# Patient Record
Sex: MALE | Race: WHITE | NOT HISPANIC OR LATINO | Employment: OTHER | URBAN - METROPOLITAN AREA
[De-identification: names, ages, dates, MRNs, and addresses within clinical notes are randomized per-mention and may not be internally consistent; named-entity substitution may affect disease eponyms.]

---

## 2017-02-07 ENCOUNTER — TELEPHONE (OUTPATIENT)
Dept: GASTROENTEROLOGY | Facility: CLINIC | Age: 67
End: 2017-02-07

## 2017-02-07 NOTE — TELEPHONE ENCOUNTER
----- Message from Larry Ferreira sent at 2/7/2017 11:09 AM EST -----  Regarding: RX   Contact: [phone]  DR. SALCEDO- PT NEED A RX FOR ESOMEPRAZOLE 40 MG AND COMPLAIN OF STOMACH PAIN..     [facility]# [phone]

## 2017-02-07 NOTE — TELEPHONE ENCOUNTER
Returned patient's phone call. Patient states when he last saw Dr. Servin a year ago his insurance would not cover Nexium. He has recently changed insurances and would like to try and see if the medication is now covered. When asked what medication he is taking he states he has been purchasing Omeprazole 20 mg OTC and taking 2 tablets twice a day but is not getting good coverage; he is having break through pain. Advised since it been a little over a year, he has an appointment to see Dr. Servin on the 9th he needed to keep his appointment and discuss his symptoms with Dr. Servin.  Patient verb understanding and Nexium not ordered at this time. Patient to continue using his Omeprazole until he is seen by Dr. Servin.

## 2017-02-09 ENCOUNTER — OFFICE VISIT (OUTPATIENT)
Dept: GASTROENTEROLOGY | Facility: CLINIC | Age: 67
End: 2017-02-09

## 2017-02-09 VITALS
WEIGHT: 181.8 LBS | SYSTOLIC BLOOD PRESSURE: 110 MMHG | HEIGHT: 68 IN | DIASTOLIC BLOOD PRESSURE: 70 MMHG | BODY MASS INDEX: 27.55 KG/M2

## 2017-02-09 DIAGNOSIS — K21.9 GASTROESOPHAGEAL REFLUX DISEASE WITHOUT ESOPHAGITIS: Primary | ICD-10-CM

## 2017-02-09 DIAGNOSIS — K63.5 COLON POLYPS: ICD-10-CM

## 2017-02-09 DIAGNOSIS — R12 HEARTBURN: ICD-10-CM

## 2017-02-09 PROCEDURE — 99213 OFFICE O/P EST LOW 20 MIN: CPT | Performed by: INTERNAL MEDICINE

## 2017-02-09 RX ORDER — LISINOPRIL 5 MG/1
10 TABLET ORAL
COMMUNITY

## 2017-02-09 RX ORDER — BUPROPION HYDROCHLORIDE 150 MG/1
TABLET ORAL
COMMUNITY
Start: 2013-12-06

## 2017-02-09 RX ORDER — ACETAMINOPHEN 325 MG/1
TABLET ORAL
COMMUNITY
Start: 2013-12-06

## 2017-02-09 RX ORDER — METOPROLOL SUCCINATE 100 MG/1
TABLET, EXTENDED RELEASE ORAL
COMMUNITY
Start: 2016-05-18

## 2017-02-09 RX ORDER — OMEPRAZOLE 40 MG/1
40 CAPSULE, DELAYED RELEASE ORAL
COMMUNITY
Start: 2015-05-11 | End: 2017-03-30 | Stop reason: DRUGHIGH

## 2017-02-09 RX ORDER — EPLERENONE 25 MG/1
TABLET, FILM COATED ORAL DAILY
COMMUNITY
Start: 2015-02-03

## 2017-02-09 RX ORDER — ROSUVASTATIN CALCIUM 40 MG/1
TABLET, COATED ORAL
COMMUNITY
Start: 2014-06-11

## 2017-02-09 NOTE — PROGRESS NOTES
Chief Complaint   Patient presents with   • Heartburn       Bryan Rodriguez is a  66 y.o. male here for a follow up visit for GERD    Heartburn   He complains of belching, choking, coughing, globus sensation, heartburn and water brash. He reports no abdominal pain, no dysphagia, no early satiety, no hoarse voice, no nausea, no sore throat, no tooth decay or no wheezing. This is a chronic problem. The current episode started more than 1 year ago. The problem occurs frequently. The problem has been waxing and waning. The heartburn duration is an hour. The heartburn is located in the substernum. The heartburn is of moderate intensity. The heartburn does not wake him from sleep. The heartburn does not limit his activity. The heartburn changes with position. The symptoms are aggravated by certain foods. Pertinent negatives include no anemia, fatigue, melena, muscle weakness, orthopnea or weight loss. There are no known risk factors. He has tried an antacid, a diet change and a PPI (He is on twice a day PPI, omeprazole with continued symptoms) for the symptoms. The treatment provided mild relief. Past procedures include an EGD. Past procedures do not include an abdominal ultrasound, esophageal manometry, esophageal pH monitoring, H. pylori antibody titer or a UGI. Past invasive treatments do not include reflux surgery.       Past Medical History   Diagnosis Date   • GERD (gastroesophageal reflux disease)    • Heart attack    • Hyperlipidemia    • Hypertension        Past Surgical History   Procedure Laterality Date   • Carotid stent     • Colonoscopy     • Upper gastrointestinal endoscopy         Scheduled Meds:    Continuous Infusions:  No current facility-administered medications for this visit.     PRN Meds:.    Allergies   Allergen Reactions   • Codeine    • Isosorbide Other (See Comments)       Social History     Social History   • Marital status:      Spouse name: N/A   • Number of children: N/A   • Years of  education: N/A     Occupational History   • Not on file.     Social History Main Topics   • Smoking status: Never Smoker   • Smokeless tobacco: Never Used   • Alcohol use Yes      Comment: 7   • Drug use: No   • Sexual activity: Not on file     Other Topics Concern   • Not on file     Social History Narrative   • No narrative on file       Family History   Problem Relation Age of Onset   • Ulcerative colitis Mother    • ALBERT disease Father        Review of Systems   Constitutional: Negative for fatigue and weight loss.   HENT: Negative for hoarse voice and sore throat.    Respiratory: Positive for cough and choking. Negative for wheezing.    Gastrointestinal: Positive for heartburn. Negative for abdominal pain, dysphagia, melena and nausea.   Musculoskeletal: Negative for muscle weakness.   All other systems reviewed and are negative.      Vitals:    02/09/17 1328   BP: 110/70       Physical Exam   Constitutional: He is oriented to person, place, and time. He appears well-developed and well-nourished.   HENT:   Head: Normocephalic and atraumatic.   Eyes: EOM are normal. Pupils are equal, round, and reactive to light.   Cardiovascular: Normal rate, regular rhythm and normal heart sounds.    Pulmonary/Chest: Effort normal.   Abdominal: Soft. Bowel sounds are normal. He exhibits no distension and no mass. There is no tenderness. No hernia.   Musculoskeletal: Normal range of motion.   Neurological: He is alert and oriented to person, place, and time.   Skin: Skin is dry.   Psychiatric: He has a normal mood and affect. His behavior is normal. Judgment and thought content normal.       No images are attached to the encounter.    Problem list    GERD, refractory to omeprazole twice a day  No alarm symptoms requiring repeat EGD  Colonoscopy with colon polyps found last year (tub ad)      Assessment/Plan    EGD and colonoscopy in 4 years  Dexilant every morning samples given  And go to twice a day if needed    If this  medication does work we will plan for prescription for dexilant  Hold on pH testing at this point

## 2017-02-24 ENCOUNTER — TELEPHONE (OUTPATIENT)
Dept: GASTROENTEROLOGY | Facility: CLINIC | Age: 67
End: 2017-02-24

## 2017-02-24 NOTE — TELEPHONE ENCOUNTER
Patient left  asking for return phone call. Patient states he is having break through pain when taking Dexilant daily.  Advised according to Dr. Servin's last office note he can increase his Dexilant 60 mg one capsule twice a day to see if he can get better coverage. He verb understanding.  Left samples at the 's desk.

## 2017-03-21 ENCOUNTER — TELEPHONE (OUTPATIENT)
Dept: GASTROENTEROLOGY | Facility: CLINIC | Age: 67
End: 2017-03-21

## 2017-03-21 RX ORDER — DEXLANSOPRAZOLE 60 MG/1
60 CAPSULE, DELAYED RELEASE ORAL 2 TIMES DAILY
Qty: 180 CAPSULE | Refills: 3 | Status: SHIPPED | OUTPATIENT
Start: 2017-03-21 | End: 2017-03-30 | Stop reason: DRUGHIGH

## 2017-03-21 NOTE — TELEPHONE ENCOUNTER
Returned patient's phone call. He states the Dexilant twice a day works to control his GERD most of the time.  Would like a 90 script sent to his pharmacy.  Escribed Dexilant 60 mg one tablet twice a day #180 Refill 3.

## 2017-03-21 NOTE — TELEPHONE ENCOUNTER
----- Message from Larry Ferreira sent at 3/21/2017  4:12 PM EDT -----  Regarding: RX  Contact: [phone]  PT CALLED STATED DEXILANT 60 MG IS WORKING AND NEED A RX..       [facility]# [phone]

## 2017-03-28 ENCOUNTER — TELEPHONE (OUTPATIENT)
Dept: GASTROENTEROLOGY | Facility: CLINIC | Age: 67
End: 2017-03-28

## 2017-03-28 NOTE — TELEPHONE ENCOUNTER
IRVINGI: [facility] sent a letter saying that they approved temp. dexilant 60 mg capsule 3/21/2017 for 30 days only.Reason was this is not a drug covered under their formulary and after 30 days they will no longer pay for it. (dr. Servin pt.)

## 2017-03-30 RX ORDER — LANSOPRAZOLE 30 MG/1
30 CAPSULE, DELAYED RELEASE ORAL 2 TIMES DAILY
Qty: 60 CAPSULE | Refills: 11 | Status: SHIPPED | OUTPATIENT
Start: 2017-03-30

## 2017-03-30 NOTE — TELEPHONE ENCOUNTER
Per Dr. Servin: Lanzoprazole 30mg  by mouth twice a day #60   12 refills (Routing comment).   E-scribed as per Dr. Servin's notes.

## 2017-08-07 ENCOUNTER — TELEPHONE (OUTPATIENT)
Dept: GASTROENTEROLOGY | Facility: CLINIC | Age: 67
End: 2017-08-07

## 2017-08-07 RX ORDER — PANTOPRAZOLE SODIUM 40 MG/1
40 TABLET, DELAYED RELEASE ORAL DAILY
Qty: 90 TABLET | Refills: 3 | Status: SHIPPED | OUTPATIENT
Start: 2017-08-07 | End: 2018-09-07 | Stop reason: SDUPTHER

## 2017-08-07 NOTE — TELEPHONE ENCOUNTER
Returned patient's phone call. He states the Lansoprazole is not working. He asked if he could change medication. Patient placed on Pantoprazole 40 mg one tablet daily #90 refill 3.  He verb understanding.

## 2017-08-07 NOTE — TELEPHONE ENCOUNTER
----- Message from Erasto Gonzales sent at 8/7/2017  3:00 PM EDT -----  Regarding: PT CALLED  Contact: [phone]   PT IS CALLING STATING HIS REFLUX IS DRIVING HIM CRAZY, CANT SEEM TO FIND THE RIGHT MEDICATION, HE IS ASKING FOR A NURSE TO CALL HIM BACK ON WHAT HE CAN DO ??

## 2017-08-09 ENCOUNTER — OFFICE VISIT (OUTPATIENT)
Dept: GASTROENTEROLOGY | Facility: CLINIC | Age: 67
End: 2017-08-09

## 2017-08-09 VITALS
HEIGHT: 68 IN | SYSTOLIC BLOOD PRESSURE: 110 MMHG | DIASTOLIC BLOOD PRESSURE: 62 MMHG | TEMPERATURE: 98.4 F | BODY MASS INDEX: 27.04 KG/M2 | WEIGHT: 178.4 LBS

## 2017-08-09 DIAGNOSIS — K63.5 COLON POLYPS: Primary | ICD-10-CM

## 2017-08-09 DIAGNOSIS — K21.9 GASTROESOPHAGEAL REFLUX DISEASE, ESOPHAGITIS PRESENCE NOT SPECIFIED: ICD-10-CM

## 2017-08-09 PROCEDURE — 99213 OFFICE O/P EST LOW 20 MIN: CPT | Performed by: NURSE PRACTITIONER

## 2017-08-09 RX ORDER — OXYMETAZOLINE HYDROCHLORIDE 0.05 G/100ML
SPRAY NASAL
COMMUNITY
Start: 2013-12-06

## 2017-08-09 NOTE — PROGRESS NOTES
"Chief Complaint   Patient presents with   • Heartburn     HPI    66-year-old male patient of Dr. Servin's last evaluated in the office on February 9, 2017.  At that time presented for follow-up for history of GERD.  Patient at that time was complaining of belching, choking, coughing, globus sensation, heartburn and water brash.  He denied any abdominal pain, dysphagia and no nausea or vomiting at that time.  Previous medication trials had included antacids, diet change and was currently on twice a day omeprazole with only mild improvement in symptoms.  Patient underwent EGD on February 4, 2016 with no findings with regard to the esophagus to account for dysphagia.  Dilation was performed anyway.  There was no evidence of esophagitis, pertinent for mild gastritis with negative pathology.  Patient was changed to Dexilant and even trialed the medication twice a day which was of no benefit.  He was then changed to lansoprazole twice a day, he reported no improvement in symptoms.  He has most recently been prescribed pantoprazole to be taken twice a day and is awaiting arrival of that medication through his mail order pharmacy.  He continues to report  \"feels like things are getting hung\" mostly food and rare occurrences with liquids.  He denies any odynophagia.  He has had no weight loss.  His symptoms are worse when he lies down at night although they do not awaken him through the night.  He reports previous treatment with Nexium afforded him the best symptom relief thus far with all other PPI medication trials.    He is also followed for history of colon polyps.  Prior colonoscopy performed in February 2017 with findings of polyps with pathology consistent with tubular adenoma.  Recall colonoscopy planned for 5 years.      Past Medical History:   Diagnosis Date   • GERD (gastroesophageal reflux disease)    • Heart attack    • Hyperlipidemia    • Hypertension      Past Surgical History:   Procedure Laterality Date   • " CAROTID STENT     • COLONOSCOPY  02/04/2016    tubular adenoma, NBIH   • UPPER GASTROINTESTINAL ENDOSCOPY  02/04/2016    no endoscopic abnormality to explain patient's dysphagia, esophagus dilated, acute gastritis       Current Outpatient Prescriptions   Medication Sig Dispense Refill   • acetaminophen (TYLENOL) 325 MG tablet Take  by mouth.     • aspirin 81 MG tablet Take 81 mg by mouth.     • buPROPion XL (WELLBUTRIN XL) 150 MG 24 hr tablet Take  by mouth.     • eplerenone (INSPRA) 25 MG tablet Take  by mouth Daily.     • lansoprazole (PREVACID) 30 MG capsule Take 1 capsule by mouth 2 (Two) Times a Day. 60 capsule 11   • lisinopril (PRINIVIL,ZESTRIL) 5 MG tablet Take 10 mg by mouth.     • metFORMIN (GLUCOPHAGE) 500 MG tablet TAKE 1 TABLET BY MOUTH TWICE DAILY WITH MEALS     • metoprolol succinate XL (TOPROL-XL) 100 MG 24 hr tablet Take one and half daily     • oxymetazoline (AFRIN NASAL SPRAY) 0.05 % nasal spray into each nostril.     • rosuvastatin (CRESTOR) 40 MG tablet Take  by mouth.     • pantoprazole (PROTONIX) 40 MG EC tablet Take 1 tablet by mouth Daily. 90 tablet 3     No current facility-administered medications for this visit.        PRN Meds:.    Allergies   Allergen Reactions   • Codeine    • Isosorbide Nitrate Other (See Comments)       Social History     Social History   • Marital status:      Spouse name: N/A   • Number of children: N/A   • Years of education: N/A     Occupational History   • Not on file.     Social History Main Topics   • Smoking status: Never Smoker   • Smokeless tobacco: Never Used   • Alcohol use Yes      Comment: 7   • Drug use: No   • Sexual activity: Not on file     Other Topics Concern   • Not on file     Social History Narrative       Family History   Problem Relation Age of Onset   • Ulcerative colitis Mother    • ALBERT disease Father        Review of Systems   Constitutional: Negative for activity change, appetite change and unexpected weight change.   HENT:  "Positive for trouble swallowing.    Gastrointestinal: Negative for abdominal distention, abdominal pain, constipation, diarrhea, nausea and vomiting.   Allergic/Immunologic: Negative for immunocompromised state.       Vitals:    08/09/17 0813   BP: 110/62   Temp: 98.4 °F (36.9 °C)     /62  Temp 98.4 °F (36.9 °C)  Ht 68\" (172.7 cm)  Wt 178 lb 6.4 oz (80.9 kg)  BMI 27.13 kg/m2  Physical Exam   Constitutional: He is oriented to person, place, and time. He appears well-developed and well-nourished. No distress.   HENT:   Head: Normocephalic and atraumatic.   Neck: Neck supple. No tracheal deviation present. No thyromegaly present.   Cardiovascular: Normal rate and regular rhythm.    Pulmonary/Chest: Effort normal and breath sounds normal.   Abdominal: Soft. Bowel sounds are normal. He exhibits no distension. There is no tenderness.   Neurological: He is alert and oriented to person, place, and time.   Skin: Skin is warm and dry.   Psychiatric: He has a normal mood and affect.   Vitals reviewed.      ASSESSMENT AND PLAN    Bryan was seen today for heartburn.    Diagnoses and all orders for this visit:    Colon polyps  Comments:  c-scope 2/2016 with polyps, NBIH, path c/w TA, recall 5 years    Gastroesophageal reflux disease, esophagitis presence not specified  Comments:  EGD 2/2016, no issues re dysphagia, dilated anyway, gastritis witih negative path.  Issues continue    Patient will continue the lansoprazole until pantoprazole arrives from his mail order pharmacy.  He was instructed to contact us approximately 2 weeks after initiation of the pantoprazole.  If he is still having symptoms at that time would recommend changing him back to Nexium which he reports offered him the best benefit thus far of all of the medication trials.  He has no acute or red flag symptoms that would warrant a repeat EGD at this time, specifically no odynophagia and no weight loss.  His symptoms with regards to complaints of " dysphagia date back greater than 6 months with prior negative EGD.        STEPHANIE Mcdonald   [facility]  [facility] [place] [address]  Office: [phone]

## 2018-07-19 RX ORDER — PANTOPRAZOLE SODIUM 40 MG/1
TABLET, DELAYED RELEASE ORAL
Qty: 90 TABLET | Refills: 3 | OUTPATIENT
Start: 2018-07-19

## 2018-09-07 ENCOUNTER — TELEPHONE (OUTPATIENT)
Dept: GASTROENTEROLOGY | Facility: CLINIC | Age: 68
End: 2018-09-07

## 2018-09-07 RX ORDER — PANTOPRAZOLE SODIUM 40 MG/1
40 TABLET, DELAYED RELEASE ORAL DAILY
Qty: 90 TABLET | Refills: 0 | Status: SHIPPED | OUTPATIENT
Start: 2018-09-07

## 2018-09-07 NOTE — TELEPHONE ENCOUNTER
----- Message from Erasto Gonzales sent at 9/7/2018 12:05 PM EDT -----  Regarding: PT CALLED   Contact: [phone]  PT IS CALLING TRYING TO GET A REFILL ON HIS (PROTONIX). SAYS HE IS ON A FIXED INCOME & CANT COME INTO THE OFFICE. BUT WANTS HIS MEDICATION TO BE REFILLED.

## 2018-12-07 RX ORDER — PANTOPRAZOLE SODIUM 40 MG/1
TABLET, DELAYED RELEASE ORAL
Qty: 90 TABLET | Refills: 0 | OUTPATIENT
Start: 2018-12-07

## 2021-03-22 ENCOUNTER — BULK ORDERING (OUTPATIENT)
Dept: CASE MANAGEMENT | Facility: OTHER | Age: 71
End: 2021-03-22

## 2021-03-22 DIAGNOSIS — Z23 IMMUNIZATION DUE: ICD-10-CM

## 2024-10-03 ENCOUNTER — APPOINTMENT (RX ONLY)
Dept: URBAN - METROPOLITAN AREA CLINIC 146 | Facility: CLINIC | Age: 74
Setting detail: DERMATOLOGY
End: 2024-10-03

## 2024-10-03 DIAGNOSIS — D49.2 NEOPLASM OF UNSPECIFIED BEHAVIOR OF BONE, SOFT TISSUE, AND SKIN: ICD-10-CM

## 2024-10-03 PROCEDURE — 11102 TANGNTL BX SKIN SINGLE LES: CPT

## 2024-10-03 PROCEDURE — ? BIOPSY BY SHAVE METHOD

## 2024-10-03 ASSESSMENT — LOCATION SIMPLE DESCRIPTION DERM: LOCATION SIMPLE: LEFT SMALL FINGER

## 2024-10-03 ASSESSMENT — LOCATION DETAILED DESCRIPTION DERM: LOCATION DETAILED: LEFT DISTAL RADIAL PALMAR SMALL FINGER

## 2024-10-03 ASSESSMENT — LOCATION ZONE DERM: LOCATION ZONE: FINGER

## 2024-10-03 NOTE — PROCEDURE: BIOPSY BY SHAVE METHOD
Detail Level: Detailed
Depth Of Biopsy: dermis
Was A Bandage Applied: Yes
Size Of Lesion In Cm: 1
X Size Of Lesion In Cm: 0
Biopsy Type: H and E
Biopsy Method: Dermablade
Anesthesia Type: 1% lidocaine with epinephrine
Anesthesia Volume In Cc: 0.5
Hemostasis: Drysol
Wound Care: Petrolatum
Dressing: bandage
Destruction After The Procedure: No
Type Of Destruction Used: Curettage
Curettage Text: The wound bed was treated with curettage after the biopsy was performed.
Cryotherapy Text: The wound bed was treated with cryotherapy after the biopsy was performed.
Electrodesiccation Text: The wound bed was treated with electrodesiccation after the biopsy was performed.
Electrodesiccation And Curettage Text: The wound bed was treated with electrodesiccation and curettage after the biopsy was performed.
Silver Nitrate Text: The wound bed was treated with silver nitrate after the biopsy was performed.
Lab: [id number]
Consent: Written consent was obtained and risks were reviewed including but not limited to scarring, infection, bleeding, scabbing, incomplete removal, nerve damage and allergy to anesthesia.
Post-Care Instructions: I reviewed with the patient in detail post-care instructions. Patient is to keep the biopsy site dry overnight, and then apply bacitracin twice daily until healed. Patient may apply hydrogen peroxide soaks to remove any crusting.
Notification Instructions: Patient will be notified of biopsy results. However, patient instructed to call the office if not contacted within 2 weeks.
Billing Type: Third-Party Bill
Information: Selecting Yes will display possible errors in your note based on the variables you have selected. This validation is only offered as a suggestion for you. PLEASE NOTE THAT THE VALIDATION TEXT WILL BE REMOVED WHEN YOU FINALIZE YOUR NOTE. IF YOU WANT TO FAX A PRELIMINARY NOTE YOU WILL NEED TO TOGGLE THIS TO 'NO' IF YOU DO NOT WANT IT IN YOUR FAXED NOTE.

## 2024-10-17 ENCOUNTER — RX ONLY (OUTPATIENT)
Age: 74
Setting detail: RX ONLY
End: 2024-10-17

## 2024-10-17 RX ORDER — CLOBETASOL PROPIONATE 0.5 MG/G
CREAM TOPICAL
Qty: 60 | Refills: 0 | Status: ERX | COMMUNITY
Start: 2024-10-17